# Patient Record
Sex: MALE | Race: WHITE | URBAN - METROPOLITAN AREA
[De-identification: names, ages, dates, MRNs, and addresses within clinical notes are randomized per-mention and may not be internally consistent; named-entity substitution may affect disease eponyms.]

---

## 2020-10-22 ENCOUNTER — OFFICE VISIT (OUTPATIENT)
Dept: URGENT CARE | Age: 40
End: 2020-10-22
Payer: COMMERCIAL

## 2020-10-22 VITALS — RESPIRATION RATE: 15 BRPM | OXYGEN SATURATION: 97 % | HEART RATE: 94 BPM | TEMPERATURE: 98 F

## 2020-10-22 DIAGNOSIS — Z91.89 AT INCREASED RISK OF EXPOSURE TO COVID-19 VIRUS: Primary | ICD-10-CM

## 2020-10-22 PROCEDURE — 99202 OFFICE O/P NEW SF 15 MIN: CPT | Performed by: FAMILY MEDICINE

## 2020-10-22 NOTE — PROGRESS NOTES
This patient was seen in [facility] at [address] [place] [address] [facility] while in their vehicle due to COVID-19 pandemic with PPE and focused examination in order to decrease community viral transmission. The patient/guardian gave verbal consent to treat. Cough   The history is provided by the patient. This is a new problem. The current episode started 2 days ago. The problem occurs constantly. The cough is non-productive. There has been no fever. Pertinent negatives include no chills, no rhinorrhea, no sore throat, no shortness of breath and no wheezing. He has tried nothing for the symptoms. Risk factors: close friend tested positive for covid. He is not a smoker. His past medical history is significant for bronchitis. His past medical history does not include asthma. History reviewed. No pertinent past medical history. History reviewed. No pertinent surgical history. History reviewed. No pertinent family history.      Social History     Socioeconomic History    Marital status: UNKNOWN     Spouse name: Not on file    Number of children: Not on file    Years of education: Not on file    Highest education level: Not on file   Occupational History    Not on file   Social Needs    Financial resource strain: Not on file    Food insecurity     Worry: Not on file     Inability: Not on file    Transportation needs     Medical: Not on file     Non-medical: Not on file   Tobacco Use    Smoking status: Never Smoker    Smokeless tobacco: Never Used   Substance and Sexual Activity    Alcohol use: Never     Frequency: Never    Drug use: Not on file    Sexual activity: Not on file   Lifestyle    Physical activity     Days per week: Not on file     Minutes per session: Not on file    Stress: Not on file   Relationships    Social connections     Talks on phone: Not on file     Gets together: Not on file     Attends Jain service: Not on file     Active member of club or organization: Not on file     Attends meetings of clubs or organizations: Not on file     Relationship status: Not on file    Intimate partner violence     Fear of current or ex partner: Not on file     Emotionally abused: Not on file     Physically abused: Not on file     Forced sexual activity: Not on file   Other Topics Concern    Not on file   Social History Narrative    Not on file                ALLERGIES: Patient has no known allergies. Review of Systems   Constitutional: Negative for chills. HENT: Negative for rhinorrhea and sore throat. Respiratory: Positive for cough. Negative for shortness of breath and wheezing. All other systems reviewed and are negative. Vitals:    10/22/20 1538   Pulse: 94   Resp: 15   Temp: 98 °F (36.7 °C)   SpO2: 97%       Physical Exam  Vitals signs and nursing note reviewed. Constitutional:       General: He is not in acute distress. Appearance: He is not ill-appearing. Pulmonary:      Effort: Pulmonary effort is normal. No respiratory distress. Breath sounds: Normal breath sounds. MDM    Procedures        ICD-10-CM ICD-9-CM    1. At increased risk of exposure to COVID-19 virus  Z91.89 V15.89 NOVEL CORONAVIRUS (COVID-19)      Tested for Covid-19 and advised to quarantine until result comes back. No orders of the defined types were placed in this encounter. No results found for any visits on 10/22/20. The patients condition was discussed with the patient and they understand. The patient is to follow up with primary care doctor. If signs and symptoms become worse the pt is to go to the ER. The patient is to take medications as prescribed.

## 2020-10-24 LAB — SARS-COV-2, NAA: NOT DETECTED
